# Patient Record
Sex: FEMALE | Race: BLACK OR AFRICAN AMERICAN | NOT HISPANIC OR LATINO | URBAN - METROPOLITAN AREA
[De-identification: names, ages, dates, MRNs, and addresses within clinical notes are randomized per-mention and may not be internally consistent; named-entity substitution may affect disease eponyms.]

---

## 2023-11-07 ENCOUNTER — OFFICE VISIT (OUTPATIENT)
Dept: OBSTETRICS AND GYNECOLOGY | Facility: CLINIC | Age: 28
End: 2023-11-07
Payer: OTHER GOVERNMENT

## 2023-11-07 VITALS
BODY MASS INDEX: 37.51 KG/M2 | DIASTOLIC BLOOD PRESSURE: 70 MMHG | HEIGHT: 67 IN | WEIGHT: 239 LBS | SYSTOLIC BLOOD PRESSURE: 120 MMHG

## 2023-11-07 DIAGNOSIS — Z01.419 ENCOUNTER FOR ANNUAL ROUTINE GYNECOLOGICAL EXAMINATION: Primary | ICD-10-CM

## 2023-11-07 PROCEDURE — 1036F TOBACCO NON-USER: CPT | Performed by: OBSTETRICS & GYNECOLOGY

## 2023-11-07 PROCEDURE — 88175 CYTOPATH C/V AUTO FLUID REDO: CPT

## 2023-11-07 PROCEDURE — 99385 PREV VISIT NEW AGE 18-39: CPT | Performed by: OBSTETRICS & GYNECOLOGY

## 2023-11-07 ASSESSMENT — PAIN SCALES - GENERAL: PAINLEVEL: 0-NO PAIN

## 2023-11-07 NOTE — PATIENT INSTRUCTIONS
Thanks for coming in today for your annual GYN exam.    A Pap smear was sent.  Results should be available in the next few weeks.  However, if you have been unable to review the results by the end of the month please give the office a call.    A prescription for a prenatal vitamin has been sent to your pharmacy.  Start taking 1 tablet daily.    Follow-up with your PCP and other healthcare specialist as needed.    Feel free to call the office with any problems, questions or concerns prior to your next scheduled visit.

## 2023-11-07 NOTE — PROGRESS NOTES
"27-year-old obese -0-0-1 -American woman presents today for annual GYN exam without gynecologic complaints.  She had a Nexplanon removed in 2023 and has resumed normal menstrual cycles.    GynHx: Menarche began at age 12.  She has monthly cycles.  She denies any STIs, PID, abnormal Pap smears or sexual abuse.  She is sexually active 1 male partner, her  and would not mind a pregnancy.    OBHx:  x1.       Objective   /70   Ht 1.702 m (5' 7\")   Wt 108 kg (239 lb)   LMP 10/13/2023   BMI 37.43 kg/m²     General:   alert and oriented, in no acute distress, appears stated age, and cooperative   Heart: regular rate and rhythm, S1, S2 normal, no murmur, click, rub or gallop   Lungs: clear to auscultation bilaterally   Abdomen: soft, non-tender, without masses or organomegaly   Vulva: Bartholin's, Urethra, [place]uc[place]'s normal   Vagina: normal mucosa   Cervix: no cervical motion tenderness   Uterus: normal shape and consistency   Adnexa: normal adnexa   Breast: WNL w/post op changes     A/P: APE     -  Pap sent     -  PNV     -  Covid and Zika info     -  PCP F/U   "

## 2023-11-30 LAB
CYTOLOGY CMNT CVX/VAG CYTO-IMP: NORMAL
LAB AP HPV GENOTYPE QUESTION: YES
LAB AP HPV HR: NORMAL
LABORATORY COMMENT REPORT: NORMAL
LMP START DATE: NORMAL
PATH REPORT.TOTAL CANCER: NORMAL